# Patient Record
Sex: MALE | Race: WHITE | Employment: UNEMPLOYED | URBAN - METROPOLITAN AREA
[De-identification: names, ages, dates, MRNs, and addresses within clinical notes are randomized per-mention and may not be internally consistent; named-entity substitution may affect disease eponyms.]

---

## 2021-05-05 ENCOUNTER — HOSPITAL ENCOUNTER (EMERGENCY)
Age: 36
Discharge: LEFT BEFORE DISCHARGE | End: 2021-05-05
Attending: EMERGENCY MEDICINE

## 2021-05-05 ENCOUNTER — APPOINTMENT (OUTPATIENT)
Dept: GENERAL RADIOLOGY | Age: 36
End: 2021-05-05
Attending: EMERGENCY MEDICINE

## 2021-05-05 ENCOUNTER — HOSPITAL ENCOUNTER (EMERGENCY)
Age: 36
Discharge: HOME | End: 2021-05-05
Attending: EMERGENCY MEDICINE

## 2021-05-05 VITALS
HEART RATE: 109 BPM | SYSTOLIC BLOOD PRESSURE: 114 MMHG | TEMPERATURE: 97.9 F | OXYGEN SATURATION: 99 % | HEIGHT: 72 IN | DIASTOLIC BLOOD PRESSURE: 98 MMHG | RESPIRATION RATE: 25 BRPM | BODY MASS INDEX: 26.9 KG/M2 | WEIGHT: 198.63 LBS

## 2021-05-05 VITALS
HEART RATE: 84 BPM | RESPIRATION RATE: 27 BRPM | OXYGEN SATURATION: 100 % | DIASTOLIC BLOOD PRESSURE: 89 MMHG | SYSTOLIC BLOOD PRESSURE: 152 MMHG | TEMPERATURE: 98.5 F

## 2021-05-05 DIAGNOSIS — R07.89 ATYPICAL CHEST PAIN: Primary | ICD-10-CM

## 2021-05-05 DIAGNOSIS — Z53.29 LEFT AGAINST MEDICAL ADVICE: ICD-10-CM

## 2021-05-05 DIAGNOSIS — F41.1 ANXIETY STATE: ICD-10-CM

## 2021-05-05 DIAGNOSIS — R07.9 ACUTE CHEST PAIN: Primary | ICD-10-CM

## 2021-05-05 LAB
ALBUMIN SERPL-MCNC: 4.3 G/DL (ref 3.5–5)
ALBUMIN/GLOB SERPL: 1.3 {RATIO} (ref 1.1–2.2)
ALP SERPL-CCNC: 66 U/L (ref 45–117)
ALT SERPL-CCNC: 37 U/L (ref 12–78)
ANION GAP SERPL CALC-SCNC: 6 MMOL/L (ref 5–15)
AST SERPL-CCNC: 22 U/L (ref 15–37)
ATRIAL RATE: 108 BPM
BASOPHILS # BLD: 0 K/UL (ref 0–0.1)
BASOPHILS NFR BLD: 1 % (ref 0–1)
BILIRUB SERPL-MCNC: 0.7 MG/DL (ref 0.2–1)
BUN SERPL-MCNC: 11 MG/DL (ref 6–20)
BUN/CREAT SERPL: 9 (ref 12–20)
CALCIUM SERPL-MCNC: 9.5 MG/DL (ref 8.5–10.1)
CALCULATED P AXIS, ECG09: 37 DEGREES
CALCULATED R AXIS, ECG10: 55 DEGREES
CALCULATED T AXIS, ECG11: 45 DEGREES
CHLORIDE SERPL-SCNC: 105 MMOL/L (ref 97–108)
CK MB CFR SERPL CALC: 0.8 % (ref 0–2.5)
CK MB SERPL-MCNC: 1.1 NG/ML (ref 5–25)
CK SERPL-CCNC: 136 U/L (ref 39–308)
CO2 SERPL-SCNC: 27 MMOL/L (ref 21–32)
CREAT SERPL-MCNC: 1.23 MG/DL (ref 0.7–1.3)
DIAGNOSIS, 93000: NORMAL
DIFFERENTIAL METHOD BLD: ABNORMAL
EOSINOPHIL # BLD: 0.3 K/UL (ref 0–0.4)
EOSINOPHIL NFR BLD: 4 % (ref 0–7)
ERYTHROCYTE [DISTWIDTH] IN BLOOD BY AUTOMATED COUNT: 11.9 % (ref 11.5–14.5)
GLOBULIN SER CALC-MCNC: 3.4 G/DL (ref 2–4)
GLUCOSE SERPL-MCNC: 93 MG/DL (ref 65–100)
HCT VFR BLD AUTO: 42.1 % (ref 36.6–50.3)
HGB BLD-MCNC: 14.8 G/DL (ref 12.1–17)
IMM GRANULOCYTES # BLD AUTO: 0 K/UL (ref 0–0.04)
IMM GRANULOCYTES NFR BLD AUTO: 0 % (ref 0–0.5)
LYMPHOCYTES # BLD: 3.2 K/UL (ref 0.8–3.5)
LYMPHOCYTES NFR BLD: 47 % (ref 12–49)
MCH RBC QN AUTO: 30.7 PG (ref 26–34)
MCHC RBC AUTO-ENTMCNC: 35.2 G/DL (ref 30–36.5)
MCV RBC AUTO: 87.3 FL (ref 80–99)
MONOCYTES # BLD: 0.6 K/UL (ref 0–1)
MONOCYTES NFR BLD: 9 % (ref 5–13)
NEUTS SEG # BLD: 2.7 K/UL (ref 1.8–8)
NEUTS SEG NFR BLD: 39 % (ref 32–75)
NRBC # BLD: 0 K/UL (ref 0–0.01)
NRBC BLD-RTO: 0 PER 100 WBC
P-R INTERVAL, ECG05: 146 MS
PLATELET # BLD AUTO: 345 K/UL (ref 150–400)
PMV BLD AUTO: 8.8 FL (ref 8.9–12.9)
POTASSIUM SERPL-SCNC: 4.2 MMOL/L (ref 3.5–5.1)
PROT SERPL-MCNC: 7.7 G/DL (ref 6.4–8.2)
Q-T INTERVAL, ECG07: 340 MS
QRS DURATION, ECG06: 96 MS
QTC CALCULATION (BEZET), ECG08: 455 MS
RBC # BLD AUTO: 4.82 M/UL (ref 4.1–5.7)
SODIUM SERPL-SCNC: 138 MMOL/L (ref 136–145)
TROPONIN I BLD-MCNC: <0.04 NG/ML (ref 0–0.08)
TROPONIN I SERPL-MCNC: <0.05 NG/ML
VENTRICULAR RATE, ECG03: 108 BPM
WBC # BLD AUTO: 6.8 K/UL (ref 4.1–11.1)

## 2021-05-05 PROCEDURE — 85025 COMPLETE CBC W/AUTO DIFF WBC: CPT

## 2021-05-05 PROCEDURE — 36415 COLL VENOUS BLD VENIPUNCTURE: CPT

## 2021-05-05 PROCEDURE — 80053 COMPREHEN METABOLIC PANEL: CPT

## 2021-05-05 PROCEDURE — 82550 ASSAY OF CK (CPK): CPT

## 2021-05-05 PROCEDURE — 99284 EMERGENCY DEPT VISIT MOD MDM: CPT

## 2021-05-05 PROCEDURE — 84484 ASSAY OF TROPONIN QUANT: CPT

## 2021-05-05 PROCEDURE — 93005 ELECTROCARDIOGRAM TRACING: CPT

## 2021-05-05 PROCEDURE — 71045 X-RAY EXAM CHEST 1 VIEW: CPT

## 2021-05-05 RX ORDER — SODIUM CHLORIDE 0.9 % (FLUSH) 0.9 %
5-40 SYRINGE (ML) INJECTION EVERY 8 HOURS
Status: DISCONTINUED | OUTPATIENT
Start: 2021-05-05 | End: 2021-05-05 | Stop reason: HOSPADM

## 2021-05-05 RX ORDER — SODIUM CHLORIDE 0.9 % (FLUSH) 0.9 %
5-40 SYRINGE (ML) INJECTION AS NEEDED
Status: DISCONTINUED | OUTPATIENT
Start: 2021-05-05 | End: 2021-05-05 | Stop reason: HOSPADM

## 2021-05-05 RX ORDER — NAPROXEN 500 MG/1
500 TABLET ORAL
Qty: 20 TAB | Refills: 0 | OUTPATIENT
Start: 2021-05-05 | End: 2021-05-05

## 2021-05-05 RX ORDER — FAMOTIDINE 20 MG/1
20 TABLET, FILM COATED ORAL 2 TIMES DAILY
Qty: 20 TAB | Refills: 0 | Status: SHIPPED | OUTPATIENT
Start: 2021-05-05 | End: 2021-05-15

## 2021-05-05 RX ORDER — NAPROXEN 500 MG/1
500 TABLET ORAL 2 TIMES DAILY WITH MEALS
Qty: 20 TAB | Refills: 0 | Status: SHIPPED | OUTPATIENT
Start: 2021-05-05 | End: 2021-05-15

## 2021-05-05 RX ORDER — DIAZEPAM 10 MG/2ML
2 INJECTION INTRAMUSCULAR
Status: DISCONTINUED | OUTPATIENT
Start: 2021-05-05 | End: 2021-05-05 | Stop reason: HOSPADM

## 2021-05-05 RX ORDER — KETOROLAC TROMETHAMINE 30 MG/ML
15 INJECTION, SOLUTION INTRAMUSCULAR; INTRAVENOUS
Status: DISCONTINUED | OUTPATIENT
Start: 2021-05-05 | End: 2021-05-05 | Stop reason: HOSPADM

## 2021-05-05 NOTE — ED NOTES
Pt restless on stretcher; states \"I'm dizzy\"; will not look @ RN or Tech, breathing tachypneic. Pt asked for a towel to cover his face. Towel provided. Pt not willing to answer questions asked; only states \"I know it's an anxiety attack\". RR upper 30s-40; spoke to pt in calming voice to concentrate on breathing in through the nose and out through the mouth; advised of need to slow breathing. Pt's RR decreased to 20s. Monitor x 3. Call bell within reach. [id number] - Patient was provided with discharge instructions. Instructions and any medications were reviewed with the patient &/or family by Dr. Soni Henry. Questions and concerns addressed by the provider. Patient discharged in stable condition via Anyi, RN and walked out of the ED.

## 2021-05-05 NOTE — ED NOTES
Pt recently discharged from ED. ED staff informed by Gary Talbot that patient was sitting on a bench outside and called 911 to be transported to a hospital. 911 dispatch informed on-duty [employer] [place] that patient was outside of [address] [employer] [place] found patient outside the main entrance of the hospital with another hospital employee[facility] not [facility] the patient back to the ED. Several minutes later, Gary Talbot was informed that patient had called 911 again, and that EMS was coming to transport patient back to ED entrance. Several minutes later, patient found stumbling through EMS entrance. Unsure how patient obtained access to ED through the EMS entrance. Pt registered again and placed in room at this time.

## 2021-05-05 NOTE — ED PROVIDER NOTES
EMERGENCY DEPARTMENT HISTORY AND PHYSICAL EXAM      Date: 5/5/2021  Patient Name: Jacquelin Mast    History of Presenting Illness     No chief complaint on file. History Provided By: Patient and review of medical record from earlier this morning    HPI: Jacquelin Mast, 30 y.o. male with PMHx significant for anxiety presents back to the emergency department after leaving before work-up was completed on previous visit. Patient initially presented to the ED complaining of chest pain that started earlier this evening. He had been drinking with friends and then his chest pain started. He has history of anxiety, but states that his symptoms have never been like this. According to previous note, patient denied fevers, chills, nausea, vomiting, diarrhea, abdominal pain, headache, dysuria. At the previous encounter, patient had an EKG and CBC and troponin were sent. These were unremarkable. Patient demanded to leave prior to the chest x-ray. Walked out of the ED without his paperwork. After waiting in the waiting room for a few minutes, patient called EMS to be transported to a different hospital (he is usually seen at the [facility]). When EMS refused to transfer at home to a different hospital from the waiting room, he checked back in to our emergency department. He reported to the nurse that he was feeling dizzy. Nurse noted that he was tachypneic and hyperventilating. Patient refused to answer questions other than stating \"I know it is an anxiety attack\". At the time of my interview, patient is lying on the stretcher with a towel covering his head. He completely refuses to tell me why he is here or answer any questions. When I removed the towel from his head so that I could talk to him, he became agitated and told me that I was rude and he did not want to be in my presence anymore and that I should leave the room.   Stated that he was ready for his discharge instructions and that he knew what was wrong with him. Would not answer any of my questions at all PCP: Unknown, Provider    Current Facility-Administered Medications on File Prior to Encounter   Medication Dose Route Frequency Provider Last Rate Last Admin    [DISCONTINUED] ketorolac (TORADOL) injection 15 mg  15 mg IntraVENous NOW Viviana Conrad MD        [DISCONTINUED] diazePAM (VALIUM) injection 2 mg  2 mg IntraVENous NOW Viviana Conrad MD         Current Outpatient Medications on File Prior to Encounter   Medication Sig Dispense Refill    [DISCONTINUED] naproxen (NAPROSYN) 500 mg tablet Take 1 Tab by mouth every twelve (12) hours as needed for Pain. 20 Tab 0       Past History     Past Medical History:  No past medical history on file. Past Surgical History:  No past surgical history on file. Family History:  No family history on file.     Social History:  Social History     Tobacco Use    Smoking status: Not on file   Substance Use Topics    Alcohol use: Not on file    Drug use: Not on file       Allergies:  No Known Allergies      Review of Systems   Review of Systems   Unable to perform ROS: Other       Patient unwilling to speak to me and will not answer any of my questions  Physical Exam    General appearance - well nourished, well appearing, and in no distress, lying in stretcher calmly with towel covering his face  Eyes - pupils equal and reactive, extraocular eye movements intact  ENT - mucous membranes moist, pharynx normal without lesions  Neck - supple, no significant adenopathy; non-tender to palpation  Chest -tachypneic, clear to auscultation, no wheezes, rales or rhonchi; non-tender to palpation  Heart -normal rate,, regular rhythm, S1 and S2 normal, no murmurs noted  Abdomen - soft, nontender, nondistended, no masses or organomegaly  Musculoskeletal - no joint tenderness, deformity or swelling; normal ROM  Extremities - peripheral pulses normal, no pedal edema  Skin - normal coloration and turgor, no rashes  Neurological - alert, oriented x3, normal speech, no focal findings or movement disorder noted    Diagnostic Study Results     Labs -     Recent Results (from the past 12 hour(s))   EKG, 12 LEAD, INITIAL    Collection Time: 05/05/21  1:19 AM   Result Value Ref Range    Ventricular Rate 108 BPM    Atrial Rate 108 BPM    P-R Interval 146 ms    QRS Duration 96 ms    Q-T Interval 340 ms    QTC Calculation (Bezet) 455 ms    Calculated P Axis 37 degrees    Calculated R Axis 55 degrees    Calculated T Axis 45 degrees    Diagnosis       Sinus tachycardia  Lateral infarct , age undetermined  Inferior infarct , age undetermined  No previous ECGs available     POC TROPONIN-I    Collection Time: 05/05/21  1:26 AM   Result Value Ref Range    Troponin-I (POC) <0.04 0.00 - 0.08 ng/mL   CBC WITH AUTOMATED DIFF    Collection Time: 05/05/21  1:32 AM   Result Value Ref Range    WBC 6.8 4.1 - 11.1 K/uL    RBC 4.82 4.10 - 5.70 M/uL    HGB 14.8 12.1 - 17.0 g/dL    HCT 42.1 36.6 - 50.3 %    MCV 87.3 80.0 - 99.0 FL    MCH 30.7 26.0 - 34.0 PG    MCHC 35.2 30.0 - 36.5 g/dL    RDW 11.9 11.5 - 14.5 %    PLATELET 311 634 - 085 K/uL    MPV 8.8 (L) 8.9 - 12.9 FL    NRBC 0.0 0  WBC    ABSOLUTE NRBC 0.00 0.00 - 0.01 K/uL    NEUTROPHILS 39 32 - 75 %    LYMPHOCYTES 47 12 - 49 %    MONOCYTES 9 5 - 13 %    EOSINOPHILS 4 0 - 7 %    BASOPHILS 1 0 - 1 %    IMMATURE GRANULOCYTES 0 0.0 - 0.5 %    ABS. NEUTROPHILS 2.7 1.8 - 8.0 K/UL    ABS. LYMPHOCYTES 3.2 0.8 - 3.5 K/UL    ABS. MONOCYTES 0.6 0.0 - 1.0 K/UL    ABS. EOSINOPHILS 0.3 0.0 - 0.4 K/UL    ABS. BASOPHILS 0.0 0.0 - 0.1 K/UL    ABS. IMM.  GRANS. 0.0 0.00 - 0.04 K/UL    DF AUTOMATED     TROPONIN I    Collection Time: 05/05/21  1:32 AM   Result Value Ref Range    Troponin-I, Qt. <0.05 <4.27 ng/mL   METABOLIC PANEL, COMPREHENSIVE    Collection Time: 05/05/21  2:56 AM   Result Value Ref Range    Sodium 138 136 - 145 mmol/L    Potassium 4.2 3.5 - 5.1 mmol/L    Chloride 105 97 - 108 mmol/L    CO2 27 21 - 32 mmol/L Anion gap 6 5 - 15 mmol/L    Glucose 93 65 - 100 mg/dL    BUN 11 6 - 20 MG/DL    Creatinine 1.23 0.70 - 1.30 MG/DL    BUN/Creatinine ratio 9 (L) 12 - 20      GFR est AA >60 >60 ml/min/1.73m2    GFR est non-AA >60 >60 ml/min/1.73m2    Calcium 9.5 8.5 - 10.1 MG/DL    Bilirubin, total 0.7 0.2 - 1.0 MG/DL    ALT (SGPT) 37 12 - 78 U/L    AST (SGOT) 22 15 - 37 U/L    Alk. phosphatase 66 45 - 117 U/L    Protein, total 7.7 6.4 - 8.2 g/dL    Albumin 4.3 3.5 - 5.0 g/dL    Globulin 3.4 2.0 - 4.0 g/dL    A-G Ratio 1.3 1.1 - 2.2     CK W/ CKMB & INDEX    Collection Time: 05/05/21  2:56 AM   Result Value Ref Range    CK - MB 1.1 <3.6 NG/ML    CK-MB Index 0.8 0.0 - 2.5       39 - 308 U/L       Radiologic Studies -   XR CHEST PORT   Final Result        CT Results  (Last 48 hours)    None        CXR Results  (Last 48 hours)               05/05/21 0338  XR CHEST PORT Final result    Impression:  No evidence of acute cardiopulmonary process. Narrative:  INDICATION:  Chest pain        FINDINGS: Single AP portable view of the chest obtained at 319 demonstrates a   normal cardiomediastinal silhouette. The lungs are clear bilaterally. No osseous   abnormalities are seen. Medical Decision Making   I am the first provider for this patient. I reviewed the vital signs, available nursing notes, past medical history, past surgical history, family history and social history. Vital Signs-Reviewed the patient's vital signs. Patient Vitals for the past 12 hrs:   Temp Pulse Resp BP SpO2   05/05/21 0315 -- 84 27 (!) 152/89 100 %   05/05/21 0245 98.5 °F (36.9 °C) (!) 108 28 (!) 160/96 100 %       EKG reviewed from less than 2 hours ago.   Sinus tachycardia, 108 bpm, normal axis, normal AL, QRS, QTc intervals, nonspecific ST changes    Records Reviewed: Nursing Notes and Old Medical Records    Provider Notes (Medical Decision Making):   Differential diagnosis: Acute coronary syndrome, anxiety, panic attack, pneumothorax, chest wall strain  Troponin, CBC and EKG were completed an hour or 2 ago. Will obtain CMP and attempt to convince patient to stay for chest x-ray to rule out pneumothorax. ED Course:   Initial assessment performed. The patients presenting problems have been discussed, and they are in agreement with the care plan formulated and outlined with them. I have encouraged them to ask questions as they arise throughout their visit. Progress Notes:   Patient demanding to leave at the time of my interview, however we were able to convince him to stay for a chest x-ray which did not show any acute abnormality. Patient agitated and ready for discharge. Still would not answer any of my questions. Instructed to return to the ED for worsening symptoms. Disposition:  Discharge home    PLAN:  1. Discharge Medication List as of 5/5/2021  4:46 AM      START taking these medications    Details   naproxen (Naprosyn) 500 mg tablet Take 1 Tab by mouth two (2) times daily (with meals) for 10 days. , Print, Disp-20 Tab, R-0      famotidine (Pepcid) 20 mg tablet Take 1 Tab by mouth two (2) times a day for 10 days. , Print, Disp-20 Tab, R-0           2. Follow-up Information     Follow up With Specialties Details Why Contact Info    [facility] EMERGENCY DEPT Emergency Medicine  If symptoms worsen [address] [place] [address]  [phone]    Benita Cheng,  Internal Medicine Schedule an appointment as soon as possible for a visit  Your primary care doctor at the [place] where you are usually followed. [address] [facility]  [phone]          Return to ED if worse     Diagnosis     Clinical Impression:   1. Atypical chest pain    2.  Anxiety state

## 2021-05-05 NOTE — ED NOTES
Patient arrived via EMS w/ c/c of chest pain. Blood draw and EKG done. Patient was placed on the monitor x 3. As this nurse was drawing meds to administer to the patient, the patient had taken off everything putting him on the monitor and demanded to be let go. This nurse advised the patient to talk to the doctor prior to being d/c from the IV. Patient states, \"I'm fine and don't want to pay for this just take the IV out. \"  As this nurse was preparing to take the IV out patient states, \"Will you hurry up? \"  Doctor Jacky Wakefield came to the patient's room and advised the patient to stay to medically clear him. Doctor Jacky Wakefield clearly states the risks of leaving without being medically cleared first.  Patient states understanding and will not wait for discharge paperwork or meds to be dispensed at a pharmacy.   Patient refusing to wait for registration and walks out of the hospital.

## 2021-05-05 NOTE — ED PROVIDER NOTES
EMERGENCY DEPARTMENT HISTORY AND PHYSICAL EXAM     ------------------------------------------------------------------------------------------------------  Please note that this dictation was completed with [employer], the [employer] voice recognition software. Quite often unanticipated grammatical, syntax, homophones, and other interpretive errors are inadvertently transcribed by the computer software. Please disregard these errors. Please excuse any errors that have escaped final proofreading.  -----------------------------------------------------------------------------------------------------------------    Date: 5/5/2021  Patient Name: John Da Silva    History of Presenting Illness     Chief Complaint   Patient presents with    Chest Pain     CP x 1 hr. pt describes pain as pressure. PMH anxiety       History Provided By: Patient    HPI: John Da Silva is a 30 y.o. male, with significant pmhx of anxiety, who presents via private vehicle to the ED with c/o sudden onsest of L chest pain/ pressure that started earlier this evening. Notes he has been drinking a few beers with his friends with the onset of his symptoms. Notes having history of anxiety but never having sensation like this. Pt also specifically denies any recent fevers, chills, nausea, vomiting, diarrhea, abd pain, changes in BM, urinary sxs, or headache. PCP: Unknown, Provider    Social Hx: denies tobacco, denies EtOH, denies Illicit Drugs     There are no other complaints, changes, or physical findings at this time. No Known Allergies          Past History     Past Medical History:  No past medical history on file. Past Surgical History:  No past surgical history on file. Family History:  No family history on file.     Social History:  Social History     Tobacco Use    Smoking status: Not on file   Substance Use Topics    Alcohol use: Not on file    Drug use: Not on file       Allergies:  No Known Allergies      Review of Systems Review of Systems   Constitutional: Negative for chills and fever. HENT: Negative. Eyes: Negative. Respiratory: Positive for chest tightness. Negative for cough and shortness of breath. Cardiovascular: Positive for chest pain. Negative for leg swelling. Gastrointestinal: Negative for abdominal pain, diarrhea, nausea and vomiting. Endocrine: Negative. Genitourinary: Negative for difficulty urinating and dysuria. Musculoskeletal: Negative for myalgias. Skin: Negative. Neurological: Negative. Psychiatric/Behavioral: Negative. All other systems reviewed and are negative. Physical Exam   Physical Exam  Vitals signs and nursing note reviewed. Constitutional:       General: He is in acute distress. Appearance: He is well-developed. He is not diaphoretic. HENT:      Head: Normocephalic and atraumatic. Nose: Nose normal.      Mouth/Throat:      Pharynx: No oropharyngeal exudate. Eyes:      Conjunctiva/sclera: Conjunctivae normal.      Pupils: Pupils are equal, round, and reactive to light. Neck:      Musculoskeletal: Normal range of motion and neck supple. Vascular: No JVD. Cardiovascular:      Rate and Rhythm: Normal rate and regular rhythm. Heart sounds: Normal heart sounds. No murmur. No friction rub. Pulmonary:      Effort: Pulmonary effort is normal. No respiratory distress. Breath sounds: Normal breath sounds. No stridor. No wheezing or rales. Chest:      Chest wall: No tenderness or crepitus. Abdominal:      General: Bowel sounds are normal. There is no distension. Palpations: Abdomen is soft. Tenderness: There is no abdominal tenderness. There is no rebound. Musculoskeletal: Normal range of motion. General: No tenderness. Skin:     General: Skin is warm and dry. Findings: No rash. Neurological:      Mental Status: He is alert and oriented to person, place, and time.       Cranial Nerves: No cranial nerve deficit. Psychiatric:         Speech: Speech normal.         Behavior: Behavior normal.         Thought Content: Thought content normal.         Judgment: Judgment normal.           Diagnostic Study Results     Labs -     Recent Results (from the past 12 hour(s))   EKG, 12 LEAD, INITIAL    Collection Time: 05/05/21  1:19 AM   Result Value Ref Range    Ventricular Rate 108 BPM    Atrial Rate 108 BPM    P-R Interval 146 ms    QRS Duration 96 ms    Q-T Interval 340 ms    QTC Calculation (Bezet) 455 ms    Calculated P Axis 37 degrees    Calculated R Axis 55 degrees    Calculated T Axis 45 degrees    Diagnosis       Sinus tachycardia  Lateral infarct , age undetermined  Inferior infarct , age undetermined  No previous ECGs available     POC TROPONIN-I    Collection Time: 05/05/21  1:26 AM   Result Value Ref Range    Troponin-I (POC) <0.04 0.00 - 0.08 ng/mL   CBC WITH AUTOMATED DIFF    Collection Time: 05/05/21  1:32 AM   Result Value Ref Range    WBC 6.8 4.1 - 11.1 K/uL    RBC 4.82 4.10 - 5.70 M/uL    HGB 14.8 12.1 - 17.0 g/dL    HCT 42.1 36.6 - 50.3 %    MCV 87.3 80.0 - 99.0 FL    MCH 30.7 26.0 - 34.0 PG    MCHC 35.2 30.0 - 36.5 g/dL    RDW 11.9 11.5 - 14.5 %    PLATELET 415 023 - 340 K/uL    MPV 8.8 (L) 8.9 - 12.9 FL    NRBC 0.0 0  WBC    ABSOLUTE NRBC 0.00 0.00 - 0.01 K/uL    NEUTROPHILS 39 32 - 75 %    LYMPHOCYTES 47 12 - 49 %    MONOCYTES 9 5 - 13 %    EOSINOPHILS 4 0 - 7 %    BASOPHILS 1 0 - 1 %    IMMATURE GRANULOCYTES 0 0.0 - 0.5 %    ABS. NEUTROPHILS 2.7 1.8 - 8.0 K/UL    ABS. LYMPHOCYTES 3.2 0.8 - 3.5 K/UL    ABS. MONOCYTES 0.6 0.0 - 1.0 K/UL    ABS. EOSINOPHILS 0.3 0.0 - 0.4 K/UL    ABS. BASOPHILS 0.0 0.0 - 0.1 K/UL    ABS. IMM.  GRANS. 0.0 0.00 - 0.04 K/UL    DF AUTOMATED         Radiologic Studies -   XR CHEST PA LAT    (Results Pending)     CT Results  (Last 48 hours)    None        CXR Results  (Last 48 hours)    None            Medical Decision Making   I am the first provider for this patient. I reviewed the vital signs, available nursing notes, past medical history, past surgical history, family history and social history. Vital Signs-Reviewed the patient's vital signs. Patient Vitals for the past 12 hrs:   Temp Pulse Resp BP SpO2   05/05/21 0131 -- (!) 109 25 (!) 114/98 99 %   05/05/21 0124 97.9 °F (36.6 °C) (!) 110 25 128/67 99 %       Pulse Oximetry Analysis - 99% on RA Normal      Provider Notes (Medical Decision Making):     DDX:  Pneumothorax,  Anxiety, PE, arrhythmia, NSTEMI    Plan:  EKG, labs, analgesic    Impression:  Acute chest pain    ED Course:   Initial assessment performed. The patients presenting problems have been discussed, and they are in agreement with the care plan formulated and outlined with them. I have encouraged them to ask questions as they arise throughout their visit. I reviewed our electronic medical record system for any past medical records that were available that may contribute to the patients current condition, the nursing notes and and vital signs from today's visit  Nursing notes will be reviewed as they become available in realtime while the pt has been in the ED. Den Hoffman MD      TOBACCO COUNSELING:  During evaluation pt reported that they are a current tobacco user. I have spent 3 minutes discussing the medical risks of prolonged smoking habits and advised the patient of the benefits of the cessation of smoking, providing specific suggestions on how to quit. Pt has been counseled and encouraged to quit as soon as possible in order to decrease further risks to their health. Pt has conveyed their understanding of the risks involved should they continue to use tobacco products. Den Hoffman MD      1:47 AM  Patient informs nursing staff that he would like to be discharged. Does not want to stay for further evaluation or medication. Patient notes \"vital okay for any of this. \"  Notes that he plans to go to the [facility] in the morning. Attempted discussed with patient my concerns for potential other life-threatening illnesses outside of acute MI. Patient notes he \"feels great. \"    I have informed the pt that it is recommended they should stay in the ED/Hospital to ensure adequate evaluation for their chest pain  and that by refusing the above,they are at increased risk for NSTEMI, PE, spontaneous pneumothorax. The pt is awake and alert, understands their condition and the risks involved in leaving. They are clinically aware of their surroundings and able to ask appropriate questions and the primary nurse involved in the pt's care confirms they are not clinically intoxicated with capacity to make their medical decisions. They also understand it is recommended that can return at any time to continue their treatment. the pt was provided with warnings and specific return precautions regarding worsening of his condition. Attempted to provide a discharge instructions and prescriptions to patient who eloped prior to registration or receiving any paperwork. This discussion was witnessed by Luana Stoddard R.N.           Critical Care Time:     none      Diagnosis     Clinical Impression:   1. Acute chest pain    2.  Left against medical advice        PLAN:  Nikko Connelly discharge